# Patient Record
Sex: FEMALE | Race: WHITE | NOT HISPANIC OR LATINO | Employment: UNEMPLOYED | URBAN - METROPOLITAN AREA
[De-identification: names, ages, dates, MRNs, and addresses within clinical notes are randomized per-mention and may not be internally consistent; named-entity substitution may affect disease eponyms.]

---

## 2024-03-17 ENCOUNTER — HOSPITAL ENCOUNTER (EMERGENCY)
Facility: HOSPITAL | Age: 59
Discharge: HOME | End: 2024-03-17
Attending: EMERGENCY MEDICINE

## 2024-03-17 VITALS
HEART RATE: 100 BPM | DIASTOLIC BLOOD PRESSURE: 88 MMHG | RESPIRATION RATE: 20 BRPM | TEMPERATURE: 99 F | WEIGHT: 150 LBS | OXYGEN SATURATION: 98 % | SYSTOLIC BLOOD PRESSURE: 155 MMHG

## 2024-03-17 DIAGNOSIS — J34.2 DEVIATED SEPTUM: ICD-10-CM

## 2024-03-17 DIAGNOSIS — R05.9 COUGH: ICD-10-CM

## 2024-03-17 DIAGNOSIS — J06.9 VIRAL URI WITH COUGH: Primary | ICD-10-CM

## 2024-03-17 LAB
INFLUENZA A, MOLECULAR: NEGATIVE
INFLUENZA B, MOLECULAR: NEGATIVE
SARS-COV-2 RDRP RESP QL NAA+PROBE: NEGATIVE
SPECIMEN SOURCE: NORMAL

## 2024-03-17 PROCEDURE — U0002 COVID-19 LAB TEST NON-CDC: HCPCS | Mod: ER

## 2024-03-17 PROCEDURE — 87502 INFLUENZA DNA AMP PROBE: CPT | Mod: ER

## 2024-03-17 PROCEDURE — 99283 EMERGENCY DEPT VISIT LOW MDM: CPT | Mod: 25,ER

## 2024-03-17 RX ORDER — GUAIFENESIN AND DEXTROMETHORPHAN HYDROBROMIDE 10; 100 MG/5ML; MG/5ML
5 SYRUP ORAL 4 TIMES DAILY PRN
Qty: 120 ML | Refills: 0 | Status: SHIPPED | OUTPATIENT
Start: 2024-03-17 | End: 2024-03-27

## 2024-03-17 RX ORDER — ALBUTEROL SULFATE 90 UG/1
2 AEROSOL, METERED RESPIRATORY (INHALATION) EVERY 6 HOURS PRN
Qty: 18 G | Refills: 0 | Status: SHIPPED | OUTPATIENT
Start: 2024-03-17 | End: 2025-03-17

## 2024-03-18 NOTE — ED PROVIDER NOTES
Encounter Date: 3/17/2024       History     Chief Complaint   Patient presents with    Cough     Reports to ED c c/o cough. States has been having it over a month since she was diagnosed with covid. States fever at home was 100.3. States runny nose, lj ear pain, and neck soreness.      Viki Hoang is a 58 y.o. female  has a past medical history of Anemia, Anxiety, Chronic constipation, and Fibromyalgia. presenting to the Emergency Department for Cough.  Patient reports that she was diagnosed with COVID in the middle of February.  Reports having symptoms for 1 week that resolved.  Cough came back a few days later.  Patient reports that this cough is different than the previous cough.  Cough is dry.  Reports temperature of a 100.3° today. Patient took Tylenol.  Reports ear pain, sore throat. Patient reports shortness of breath and chest pain only associated with coughing.  Patient also reports abdominal pain with coughing.  No nausea, vomiting, diarrhea.  Patient also reports that she has a chronic cough due to having a deviated nasal septum.        The history is provided by the patient.     Review of patient's allergies indicates:   Allergen Reactions    Codeine      Past Medical History:   Diagnosis Date    Anemia     Anxiety     Chronic constipation     Fibromyalgia      Past Surgical History:   Procedure Laterality Date    antrior and posterior repair      2013    APPENDECTOMY       SECTION      x3    CHOLECYSTECTOMY      COLON SURGERY      colectomy for colonic inertia    FOOT SURGERY      HYSTERECTOMY       Family History   Problem Relation Age of Onset    Breast cancer Maternal Aunt     Breast cancer Paternal Aunt     Diabetes Maternal Grandmother     Colon polyps Maternal Grandmother     Breast cancer Paternal Grandmother     Diabetes Paternal Grandmother     Ovarian cancer Neg Hx     Heart failure Neg Hx     Hypertension Neg Hx     Celiac disease Neg Hx     Cirrhosis Neg Hx     Colon cancer  Neg Hx     Crohn's disease Neg Hx     Cystic fibrosis Neg Hx     Esophageal cancer Neg Hx     Hemochromatosis Neg Hx     Inflammatory bowel disease Neg Hx     Irritable bowel syndrome Neg Hx     Liver cancer Neg Hx     Liver disease Neg Hx     Rectal cancer Neg Hx     Stomach cancer Neg Hx     Ulcerative colitis Neg Hx     Shahid's disease Neg Hx     Breast cancer Cousin      Social History     Tobacco Use    Smoking status: Never    Smokeless tobacco: Never   Substance Use Topics    Alcohol use: Yes     Comment: social    Drug use: No     Review of Systems   Constitutional:  Negative for fever.   HENT:  Positive for congestion and ear pain. Negative for sore throat.    Respiratory:  Positive for cough. Negative for shortness of breath.    Cardiovascular:  Negative for chest pain.   Gastrointestinal:  Negative for nausea.   Genitourinary:  Negative for dysuria.   Musculoskeletal:  Negative for back pain.   Skin:  Negative for rash.   Neurological:  Negative for weakness.   Hematological:  Does not bruise/bleed easily.   All other systems reviewed and are negative.      Physical Exam     Initial Vitals [03/17/24 1948]   BP Pulse Resp Temp SpO2   (!) 155/88 100 20 99.2 °F (37.3 °C) 98 %      MAP       --         Physical Exam    Nursing note and vitals reviewed.  Constitutional: She appears well-developed and well-nourished. She is not diaphoretic. No distress.   HENT:   Head: Normocephalic.   Right Ear: Hearing, tympanic membrane and external ear normal.   Left Ear: Hearing, tympanic membrane and external ear normal.   Nose: Septal deviation present.   Mouth/Throat: Oropharynx is clear and moist.   Eyes: Conjunctivae, EOM and lids are normal. Pupils are equal, round, and reactive to light.   Neck: Neck supple.   Normal range of motion.  Cardiovascular:  Normal rate, regular rhythm, normal heart sounds and intact distal pulses.           Pulmonary/Chest: Breath sounds normal. No respiratory distress. She has no  wheezes. She has no rhonchi.   Abdominal: Abdomen is soft. Bowel sounds are normal.   Musculoskeletal:         General: Normal range of motion.      Cervical back: Normal range of motion and neck supple. No rigidity.     Lymphadenopathy:     She has no cervical adenopathy.   Neurological: She is alert and oriented to person, place, and time. She has normal strength. GCS score is 15. GCS eye subscore is 4. GCS verbal subscore is 5. GCS motor subscore is 6.   Skin: Skin is warm and dry. Capillary refill takes less than 2 seconds. No rash noted.   Psychiatric: She has a normal mood and affect. Her behavior is normal. Judgment and thought content normal.         ED Course   Procedures  Labs Reviewed   INFLUENZA A & B BY MOLECULAR   SARS-COV-2 RNA AMPLIFICATION, QUAL    Narrative:     Is the patient symptomatic?->Yes          Imaging Results              X-Ray Chest PA And Lateral (Final result)  Result time 03/17/24 22:05:18      Final result by Nicolás Brown MD (03/17/24 22:05:18)                   Impression:      No acute process seen      Electronically signed by: Nicolás Brown  Date:    03/17/2024  Time:    22:05               Narrative:    EXAMINATION:  XR CHEST PA AND LATERAL    CLINICAL HISTORY:  Cough, unspecified    TECHNIQUE:  PA and lateral views of the chest were performed.    COMPARISON:  None    FINDINGS:  Lungs are clear.  No acute osseous injury.  Cardiac silhouette within normal limits.                                       Medications - No data to display  Medical Decision Making  This is an emergent evaluation of a 58 y.o. female that presents to the Emergency Department for nonproductive cough. The patient is a non-toxic and not acutely ill-appearing, afebrile, and well appearing female. Pertinent physical exam findings above. Appears well hydrated with moist mucus membranes. Neck soft and supple with no meningeal signs. Breath sounds are clear and equal bilaterally. No tachypnea or respiratory  distress and no evidence of hypoxia or cyanosis. Vital signs are reassuring.      My overall impression is cough secondary to deviated septum vs viral uri. Differential Diagnosis: Including but not limited to Sepsis, meningitis, nasal/aspirated foreign body, OM, OE, nasal polyp, bacterial sinusitis, allergic rhinitis, peritonsillar abscess, retropharyngeal abscess, epiglottitis, bacterial/viral pneumonia, bacterial/viral pharyngitis, croup, bronchiolitis, influenza, viral syndrome     Discharge Meds/Instructions: Supportive care, Tylenol/Ibuprofen PRN, Hydration.      There does not appear to be any indication for further emergent testing, observation, or hospitalization at this time. A mutual shared decision making discussion was had with the patient. Patient appears stable for and is comfortable with discharge home. The diagnosis, treatment plan, instructions for follow-up as well as ED return precautions were discussed. Advised to follow-up with PCP for outpatient follow-up in 2-3 days. Signs and symptoms that would warrant immediate return to ED were reviewed prior to discharge. All questions and concerns were asked, answered, and addressed. Patient expressed understanding and agreement with the plan.     This case was discussed with  my attending, Dr. Morales who is in agreement with my assessment and plan.        Amount and/or Complexity of Data Reviewed  Labs:  Decision-making details documented in ED Course.  Radiology: ordered. Decision-making details documented in ED Course.    Risk  OTC drugs.  Prescription drug management.               ED Course as of 03/17/24 2238   Sun Mar 17, 2024   2221 Influenza A, Molecular: Negative [LH]   2221 Influenza B, Molecular: Negative [LH]   2221 X-Ray Chest PA And Lateral  Independent interpretation by me: no lung consolidation, effusion, or pneumothorax. Cardiac silhouette appears normal. I have also read the radiologist's report and agree with their findings.    [LH]    7216 Discussed with my attending Dr. Morales.  We will also add albuterol inhaler. [LH]      ED Course User Index  [LH] Beatriz Martinez PA-C                           Clinical Impression:  Final diagnoses:  [R05.9] Cough  [J06.9] Viral URI with cough (Primary)  [J34.2] Deviated septum          ED Disposition Condition    Discharge Stable          ED Prescriptions       Medication Sig Dispense Start Date End Date Auth. Provider    dextromethorphan-guaiFENesin  mg/5 ml (ROBITUSSIN-DM)  mg/5 mL liquid Take 5 mLs by mouth 4 (four) times daily as needed (cough). 120 mL 3/17/2024 3/27/2024 Beatriz Martinez PA-C    albuterol (VENTOLIN HFA) 90 mcg/actuation inhaler Inhale 2 puffs into the lungs every 6 (six) hours as needed for Wheezing. Rescue 18 g 3/17/2024 3/17/2025 Beatriz Martinez PA-C          Follow-up Information    None          Beatriz Martinez PA-C  03/17/24 5701

## 2024-03-18 NOTE — DISCHARGE INSTRUCTIONS
You likely have a viral upper respiratory infection. There are plenty of virus besides COVID and influenza that can cause your symptoms.   - Rest.    - Drink plenty of fluids.  - Avoid crowds and wash your hands.   - Viral upper respiratory infections typically run their course in 10-14 days.   - Tylenol (acetaminophen) or Ibuprofen as directed as needed for fever/pain. You may take 1000 mg of tylenol three times a day. Avoid tylenol if you have a history of liver disease. You may take 800 mg of ibuprofen three times a day. Do not take ibuprofen if you have a history of GI bleeding, kidney disease, gastric surgery, if you take blood thinners, or if you are pregnant.   - You can take the cetirizine/zyrtec as directed. These are antihistamines that can help with runny nose, nasal congestion, sneezing, and helps to dry up post-nasal drip, which usually causes sore throat and cough.  - You can use Flonase (fluticasone) nasal spray as directed for sinus congestion and postnasal drip. This is a steroid nasal spray that works locally over time to decrease the inflammation in your nose/sinuses and help with allergic symptoms. This is not an quick- relief spray like afrin, but it works well if used daily.  Discontinue if you develop a nose bleed.  - use nasal saline prior to Flonase.  - Use Ocean Spray Nasal Saline 1-3 puffs each nostril every 2-3 hours then blow out onto tissue. This is to irrigate the nasal passage way to clear the sinus openings. Use until sinus problem resolved.  - Warm salt water gargles, cough drops, and chloraseptic spray can help with sore throat.  - Warm tea with honey can help with cough and sore throat. Honey is a natural cough suppressant.  - Dextromethorphan (DM) is a cough suppressant over the counter (ie. mucinex DM, robitussin, delsym; dayquil/nyquil has DM as well.  - Please AVOID over the counter medications URI medications that have Oxymetazoline, Phenylephrine, or Pseudoephedrine if you  have high blood pressure.  Most over the counter medications will write on the box if they are safe vs should be avoided in patients with HTN, or you can always bring the box to the pharmacist and ask if you have any questions.    - Please make an appointment with your primary care provider in the next 3 days if symptoms not improved.